# Patient Record
Sex: MALE | Race: WHITE | Employment: FULL TIME | URBAN - METROPOLITAN AREA
[De-identification: names, ages, dates, MRNs, and addresses within clinical notes are randomized per-mention and may not be internally consistent; named-entity substitution may affect disease eponyms.]

---

## 2017-09-06 ENCOUNTER — TELEPHONE (OUTPATIENT)
Dept: FAMILY MEDICINE CLINIC | Facility: CLINIC | Age: 47
End: 2017-09-06

## 2017-09-06 NOTE — TELEPHONE ENCOUNTER
Patient states he has been having elevated bp for awhile now; 2 years or more. States his last reading was 155/100. Patient states his girlfriend is a nurse and she feels something is \"off\" when she takes his bp; skipping beats?   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

## 2017-09-07 ENCOUNTER — OFFICE VISIT (OUTPATIENT)
Dept: FAMILY MEDICINE CLINIC | Facility: CLINIC | Age: 47
End: 2017-09-07

## 2017-09-07 ENCOUNTER — APPOINTMENT (OUTPATIENT)
Dept: LAB | Age: 47
End: 2017-09-07
Attending: FAMILY MEDICINE
Payer: COMMERCIAL

## 2017-09-07 VITALS
TEMPERATURE: 98 F | RESPIRATION RATE: 18 BRPM | HEART RATE: 80 BPM | BODY MASS INDEX: 41.3 KG/M2 | HEIGHT: 73.25 IN | SYSTOLIC BLOOD PRESSURE: 146 MMHG | WEIGHT: 315 LBS | DIASTOLIC BLOOD PRESSURE: 102 MMHG

## 2017-09-07 DIAGNOSIS — Z00.00 PHYSICAL EXAM: ICD-10-CM

## 2017-09-07 DIAGNOSIS — I10 ESSENTIAL HYPERTENSION: ICD-10-CM

## 2017-09-07 DIAGNOSIS — R68.82 LIBIDO, DECREASED: ICD-10-CM

## 2017-09-07 DIAGNOSIS — Z00.00 PHYSICAL EXAM: Primary | ICD-10-CM

## 2017-09-07 DIAGNOSIS — I49.3 PVC (PREMATURE VENTRICULAR CONTRACTION): ICD-10-CM

## 2017-09-07 LAB
25-HYDROXYVITAMIN D (TOTAL): 15.6 NG/ML (ref 30–100)
ALBUMIN SERPL-MCNC: 4 G/DL (ref 3.5–4.8)
ALP LIVER SERPL-CCNC: 55 U/L (ref 45–117)
ALT SERPL-CCNC: 47 U/L (ref 17–63)
AST SERPL-CCNC: 19 U/L (ref 15–41)
BASOPHILS # BLD AUTO: 0.09 X10(3) UL (ref 0–0.1)
BASOPHILS NFR BLD AUTO: 1.2 %
BILIRUB SERPL-MCNC: 0.6 MG/DL (ref 0.1–2)
BILIRUB UR QL STRIP.AUTO: NEGATIVE
BUN BLD-MCNC: 15 MG/DL (ref 8–20)
CALCIUM BLD-MCNC: 9.2 MG/DL (ref 8.3–10.3)
CHLORIDE: 104 MMOL/L (ref 101–111)
CHOLEST SMN-MCNC: 265 MG/DL (ref ?–200)
CO2: 25 MMOL/L (ref 22–32)
COMPLEXED PSA SERPL-MCNC: 0.52 NG/ML (ref 0.01–4)
CREAT BLD-MCNC: 0.95 MG/DL (ref 0.7–1.3)
EOSINOPHIL # BLD AUTO: 0.12 X10(3) UL (ref 0–0.3)
EOSINOPHIL NFR BLD AUTO: 1.6 %
ERYTHROCYTE [DISTWIDTH] IN BLOOD BY AUTOMATED COUNT: 13.2 % (ref 11.5–16)
EST. AVERAGE GLUCOSE BLD GHB EST-MCNC: 128 MG/DL (ref 68–126)
GLUCOSE BLD-MCNC: 96 MG/DL (ref 70–99)
GLUCOSE UR STRIP.AUTO-MCNC: NEGATIVE MG/DL
HAV IGM SER QL: 2.3 MG/DL (ref 1.7–3)
HBA1C MFR BLD HPLC: 6.1 % (ref ?–5.7)
HCT VFR BLD AUTO: 46.2 % (ref 37–53)
HDLC SERPL-MCNC: 36 MG/DL (ref 45–?)
HDLC SERPL: 7.36 {RATIO} (ref ?–4.97)
HGB BLD-MCNC: 15.8 G/DL (ref 13–17)
IMMATURE GRANULOCYTE COUNT: 0.02 X10(3) UL (ref 0–1)
IMMATURE GRANULOCYTE RATIO %: 0.3 %
KETONES UR STRIP.AUTO-MCNC: NEGATIVE MG/DL
LDLC SERPL CALC-MCNC: 170 MG/DL (ref ?–130)
LDLC SERPL-MCNC: 59 MG/DL (ref 5–40)
LEUKOCYTE ESTERASE UR QL STRIP.AUTO: NEGATIVE
LYMPHOCYTES # BLD AUTO: 2.5 X10(3) UL (ref 0.9–4)
LYMPHOCYTES NFR BLD AUTO: 33.2 %
M PROTEIN MFR SERPL ELPH: 7.6 G/DL (ref 6.1–8.3)
MCH RBC QN AUTO: 30 PG (ref 27–33.2)
MCHC RBC AUTO-ENTMCNC: 34.2 G/DL (ref 31–37)
MCV RBC AUTO: 87.8 FL (ref 80–99)
MONOCYTES # BLD AUTO: 0.33 X10(3) UL (ref 0.1–0.6)
MONOCYTES NFR BLD AUTO: 4.4 %
NEUTROPHIL ABS PRELIM: 4.46 X10 (3) UL (ref 1.3–6.7)
NEUTROPHILS # BLD AUTO: 4.46 X10(3) UL (ref 1.3–6.7)
NEUTROPHILS NFR BLD AUTO: 59.3 %
NITRITE UR QL STRIP.AUTO: NEGATIVE
NONHDLC SERPL-MCNC: 229 MG/DL (ref ?–130)
PH UR STRIP.AUTO: 5 [PH] (ref 4.5–8)
PLATELET # BLD AUTO: 289 10(3)UL (ref 150–450)
POTASSIUM SERPL-SCNC: 4.3 MMOL/L (ref 3.6–5.1)
PROT UR STRIP.AUTO-MCNC: NEGATIVE MG/DL
RBC # BLD AUTO: 5.26 X10(6)UL (ref 4.3–5.7)
RED CELL DISTRIBUTION WIDTH-SD: 41.3 FL (ref 35.1–46.3)
SODIUM SERPL-SCNC: 138 MMOL/L (ref 136–144)
SP GR UR STRIP.AUTO: 1.02 (ref 1–1.03)
TRIGLYCERIDES: 297 MG/DL (ref ?–150)
TSI SER-ACNC: 1.33 MIU/ML (ref 0.35–5.5)
UROBILINOGEN UR STRIP.AUTO-MCNC: <2 MG/DL
WBC # BLD AUTO: 7.5 X10(3) UL (ref 4–13)

## 2017-09-07 PROCEDURE — 80050 GENERAL HEALTH PANEL: CPT | Performed by: FAMILY MEDICINE

## 2017-09-07 PROCEDURE — 36415 COLL VENOUS BLD VENIPUNCTURE: CPT | Performed by: FAMILY MEDICINE

## 2017-09-07 PROCEDURE — 84153 ASSAY OF PSA TOTAL: CPT | Performed by: FAMILY MEDICINE

## 2017-09-07 PROCEDURE — 81001 URINALYSIS AUTO W/SCOPE: CPT | Performed by: FAMILY MEDICINE

## 2017-09-07 PROCEDURE — 84403 ASSAY OF TOTAL TESTOSTERONE: CPT | Performed by: FAMILY MEDICINE

## 2017-09-07 PROCEDURE — 99213 OFFICE O/P EST LOW 20 MIN: CPT | Performed by: FAMILY MEDICINE

## 2017-09-07 PROCEDURE — 84402 ASSAY OF FREE TESTOSTERONE: CPT | Performed by: FAMILY MEDICINE

## 2017-09-07 PROCEDURE — 93000 ELECTROCARDIOGRAM COMPLETE: CPT | Performed by: FAMILY MEDICINE

## 2017-09-07 PROCEDURE — 83036 HEMOGLOBIN GLYCOSYLATED A1C: CPT | Performed by: FAMILY MEDICINE

## 2017-09-07 PROCEDURE — 99396 PREV VISIT EST AGE 40-64: CPT | Performed by: FAMILY MEDICINE

## 2017-09-07 PROCEDURE — 82306 VITAMIN D 25 HYDROXY: CPT | Performed by: FAMILY MEDICINE

## 2017-09-07 PROCEDURE — 80061 LIPID PANEL: CPT | Performed by: FAMILY MEDICINE

## 2017-09-07 PROCEDURE — 83735 ASSAY OF MAGNESIUM: CPT | Performed by: FAMILY MEDICINE

## 2017-09-07 RX ORDER — HYDROCHLOROTHIAZIDE 12.5 MG/1
12.5 TABLET ORAL DAILY
Qty: 30 TABLET | Refills: 0 | Status: SHIPPED | OUTPATIENT
Start: 2017-09-07 | End: 2017-09-07

## 2017-09-07 NOTE — PATIENT INSTRUCTIONS
Recommend to start blood pressure medication. Advice low salt diet, weight loss and exercise. Monitor your blood pressure. Return to clinic if systolic blood pressure more than 541 or diastolic more than 171. Stop tobacco use. Check labs today.    Glenda entrances. · Use stairs instead of the elevator. · When you can, walk or bike instead of driving. · [place] leaves, garden, or do household repairs.   · Be active at a moderate to vigorous level of physical activity for at least 40 minutes for a minimum of

## 2017-09-07 NOTE — PROGRESS NOTES
[address]  PROGRESS NOTE  Chief Complaint:   Patient presents with:  Blood Pressure: past few months. Lab      HPI:   This is a 56year old male presents to clinic for evaluation of high blood pressure.   Over past few months patient's EYES:  Denies eye pain, visual loss, blurred vision, double vision or yellow sclerae. HEENT:  Denies hearing loss, sneezing, congestion, runny nose or sore throat. INTEGUMENTARY:  Denies rashes, itching, skin lesion, or excessive skin dryness.   Reza Fraser LYMPHATIC: No cervical lymphadenopathy, no other lymphadenopathy. MUSCULOSKELETAL: Normal ROM, no joint pain, or muscle weakness in all extremity. BACK: No tenderness, no spasm, SLR test negative, FROM.   NEUROLOGICAL:  No deficit, normal gait, strength High blood pressure (hypertension) is often called the silent killer. This is because many people who have it don’t know it. High blood pressure is defined as 140/90 mm Hg or higher. Know your blood pressure and remember to check it regularly.  Doing so can · Communicate your concerns with your loved ones and your healthcare provider. · Visit with family and friends, and keep up with hobbies. Limit alcohol and quit smoking  · Men should have no more than 2 drinks per day.   · Women should have no more than 1

## 2017-09-08 ENCOUNTER — TELEPHONE (OUTPATIENT)
Dept: FAMILY MEDICINE CLINIC | Facility: CLINIC | Age: 47
End: 2017-09-08

## 2017-09-08 NOTE — TELEPHONE ENCOUNTER
----- Message from Luis Mendoza MD sent at 9/8/2017  9:16 AM CDT -----  Please inform patient that his total cholesterol, triglyceride and LDL is very high. Advice low cholesterol diet and exercise. May use fish oil supplement.     Also his hemoglobin A1c

## 2017-09-12 LAB
TESTOSTERONE TOTAL: 174 NG/DL
TESTOSTERONE, FREE -MS/MS: 46.1 PG/ML

## 2017-09-13 ENCOUNTER — TELEPHONE (OUTPATIENT)
Dept: FAMILY MEDICINE CLINIC | Facility: CLINIC | Age: 47
End: 2017-09-13

## 2017-09-13 NOTE — TELEPHONE ENCOUNTER
----- Message from Kimberly Vivas MD sent at 9/12/2017  9:37 PM CDT -----  Please inform patient that his free and total testosterone level is low. Recommend regular exercise and healthy diet.  If he wish to have supplement then recommend to see endocrinol

## 2017-09-21 ENCOUNTER — OFFICE VISIT (OUTPATIENT)
Dept: FAMILY MEDICINE CLINIC | Facility: CLINIC | Age: 47
End: 2017-09-21

## 2017-09-21 VITALS
SYSTOLIC BLOOD PRESSURE: 126 MMHG | DIASTOLIC BLOOD PRESSURE: 78 MMHG | WEIGHT: 315 LBS | HEART RATE: 82 BPM | RESPIRATION RATE: 20 BRPM | BODY MASS INDEX: 42 KG/M2 | TEMPERATURE: 97 F

## 2017-09-21 DIAGNOSIS — I49.3 PVC (PREMATURE VENTRICULAR CONTRACTION): ICD-10-CM

## 2017-09-21 DIAGNOSIS — I10 ESSENTIAL HYPERTENSION: Primary | ICD-10-CM

## 2017-09-21 DIAGNOSIS — N52.9 ERECTILE DYSFUNCTION, UNSPECIFIED ERECTILE DYSFUNCTION TYPE: ICD-10-CM

## 2017-09-21 PROCEDURE — 99213 OFFICE O/P EST LOW 20 MIN: CPT | Performed by: FAMILY MEDICINE

## 2017-09-21 RX ORDER — SILDENAFIL 100 MG/1
50 TABLET, FILM COATED ORAL
Qty: 10 TABLET | Refills: 2 | Status: SHIPPED | COMMUNITY
Start: 2017-09-21 | End: 2019-10-10

## 2017-09-21 NOTE — PROGRESS NOTES
[facility] [place]  PROGRESS NOTE  Chief Complaint:   Patient presents with:  Blood Pressure: 2 week follow up       HPI:   This is a 56year old male presents for follow-up and medication refill.   He was started on metoprolol last office visit, Relation Age of Onset   • Hypertension Mother    • Myocardial infarction [OTHER] Father    • Esophageal cancer [OTHER] Father    • Kidney stone [OTHER] Brother    • Hypercholesterolemia [OTHER] Brother      Allergies:  No Known Allergies  Current Meds: conjunctiva normal.  HEAD:  Normocephalic, atraumatic   EARS: External normal.  Bilateral tympanic membranes clear   NOSE: patent, no nasal discharge   THROAT:  No post-pharyngeal erythema or exudate.   Mouth:  No oral lesions or ulcerations, good dentition or worsening or changing symptoms. Patient is to call with any side effects or complications from the treatments as a result of today.      Problem List:  Patient Active Problem List:     Pure hypercholesterolemia     Pure hypertriglyceridemia     Obesity

## 2017-09-21 NOTE — PATIENT INSTRUCTIONS
Continue current medications   Blood pressure stable today. Advice low salt diet and exercise. Monitor your blood pressure. Return to clinic if systolic blood pressure more than 494 or diastolic more than 647. Trial of viagra.    Schedule holter monitor

## 2017-09-26 ENCOUNTER — HOSPITAL ENCOUNTER (OUTPATIENT)
Dept: CV DIAGNOSTICS | Age: 47
Discharge: HOME | End: 2017-09-26
Attending: FAMILY MEDICINE
Payer: COMMERCIAL

## 2017-09-26 DIAGNOSIS — I49.3 PVC (PREMATURE VENTRICULAR CONTRACTION): ICD-10-CM

## 2017-09-26 DIAGNOSIS — I10 ESSENTIAL HYPERTENSION: ICD-10-CM

## 2017-09-26 PROCEDURE — 93227 XTRNL ECG REC<48 HR R&I: CPT | Performed by: FAMILY MEDICINE

## 2017-09-26 PROCEDURE — 93225 XTRNL ECG REC<48 HRS REC: CPT | Performed by: FAMILY MEDICINE

## 2017-09-27 ENCOUNTER — TELEPHONE (OUTPATIENT)
Dept: FAMILY MEDICINE CLINIC | Facility: CLINIC | Age: 47
End: 2017-09-27

## 2017-09-27 ENCOUNTER — HOSPITAL ENCOUNTER (OUTPATIENT)
Dept: CV DIAGNOSTICS | Age: 47
Discharge: HOME | End: 2017-09-27
Attending: FAMILY MEDICINE
Payer: COMMERCIAL

## 2017-09-27 DIAGNOSIS — R68.82 LIBIDO, DECREASED: Primary | ICD-10-CM

## 2017-09-27 DIAGNOSIS — R79.89 LOW TESTOSTERONE: ICD-10-CM

## 2017-09-27 NOTE — TELEPHONE ENCOUNTER
Recommend to take 4000 units of vitamin D supplement daily for 3 months and recheck at that time. Patient May see Dr Maxine Bowman for testosterone supplement. Please fax lab result to his office.

## 2017-09-27 NOTE — TELEPHONE ENCOUNTER
Patient is wondering if a 50,000 IU supplement of Vitamin D is enough to help increase his Vitmain D level. Patient just got the supplement OTC at the pharmacy.      Patient is also wondering if he could be referred to a endocrinologist for his low testoste

## 2017-10-04 NOTE — TELEPHONE ENCOUNTER
Yes patient is supposed to continue with metoprolol unless his blood pressure or heart rate is drops low. If that occurs recommend to return to clinic otherwise he can follow-up with me in December as recommended.

## 2017-10-04 NOTE — TELEPHONE ENCOUNTER
Patient is wondering if he is suppose to continue taking the metoprolol? Patient states that he feels great on the medication but is out now and needs a refill if he is suppose to continue the medication. Dr Cecilia Amezquita please advise. Thank you.

## 2017-10-06 ENCOUNTER — TELEPHONE (OUTPATIENT)
Dept: FAMILY MEDICINE CLINIC | Facility: CLINIC | Age: 47
End: 2017-10-06

## 2017-10-06 DIAGNOSIS — I49.3 PVC (PREMATURE VENTRICULAR CONTRACTION): Primary | ICD-10-CM

## 2017-10-25 ENCOUNTER — TELEPHONE (OUTPATIENT)
Dept: FAMILY MEDICINE CLINIC | Facility: CLINIC | Age: 47
End: 2017-10-25

## 2017-10-25 NOTE — TELEPHONE ENCOUNTER
We received medical records request from [facility] [employer] [facility] to send most recent office note, demographics, any labs, cardiac related testing and insurance information.  I printed 9/21/17 office notes, 9/7/17 office notes, lab flowsheet, and EKG repo

## 2017-11-06 ENCOUNTER — TELEPHONE (OUTPATIENT)
Dept: FAMILY MEDICINE CLINIC | Facility: CLINIC | Age: 47
End: 2017-11-06

## 2017-11-06 NOTE — TELEPHONE ENCOUNTER
Please advise refill?      Future appt:    Last Appointment:  9/21/2017    Cholesterol, Total (mg/dL)   Date Value   09/07/2017 265 (H)   ----------  HDL Cholesterol (mg/dL)   Date Value   09/07/2017 36 (L)   ----------  LDL Cholesterol (mg/dL)   Date Value

## 2017-11-13 ENCOUNTER — TELEPHONE (OUTPATIENT)
Dept: FAMILY MEDICINE CLINIC | Facility: CLINIC | Age: 47
End: 2017-11-13

## 2017-11-13 NOTE — TELEPHONE ENCOUNTER
----- Message from Alexus Gutierrez MD sent at 11/13/2017  3:31 PM CST -----  Please inform patient that holter monitor shows occassional PVCs, around 2.4% of beats were categorized as PVC.  Recommend to make appointment with Dr Kahlil Lyons and also send copy of re

## 2017-12-21 ENCOUNTER — TELEPHONE (OUTPATIENT)
Dept: FAMILY MEDICINE CLINIC | Facility: CLINIC | Age: 47
End: 2017-12-21

## 2017-12-21 DIAGNOSIS — G47.33 OSA (OBSTRUCTIVE SLEEP APNEA): Primary | ICD-10-CM

## 2017-12-21 NOTE — TELEPHONE ENCOUNTER
Please inform patient that sleep study shows that he has obstructive sleep apnea syndrome. Recommend to see Dr Dalia Omer at [facility] for further evaluation and treatment of sleep apnea.

## 2018-02-05 ENCOUNTER — SLEEP STUDY (OUTPATIENT)
Dept: FAMILY MEDICINE CLINIC | Facility: CLINIC | Age: 48
End: 2018-02-05

## 2018-02-05 DIAGNOSIS — G47.33 OBSTRUCTIVE SLEEP APNEA: Primary | ICD-10-CM

## 2018-02-05 PROCEDURE — 95811 POLYSOM 6/>YRS CPAP 4/> PARM: CPT | Performed by: FAMILY MEDICINE

## 2018-02-06 ENCOUNTER — TELEPHONE (OUTPATIENT)
Dept: FAMILY MEDICINE CLINIC | Facility: CLINIC | Age: 48
End: 2018-02-06

## 2018-02-06 NOTE — TELEPHONE ENCOUNTER
Patient had CPAP Titration study done at [facility] on 2/2/18  Study was ordered by Dr. Cecilio Douglasis was read by Dr. Alis Swan if patient cpap will be managed by Dr. Richelle Hogue or Dr. Ally Diaz  Left message for patient to call office.     Vanessa Teague, 02/06/18,

## 2018-02-07 NOTE — TELEPHONE ENCOUNTER
Patient unsure what the plan is supposed to be. States he will contact Dr. Yina Ramires office and return the call once he knows where to go from here.

## 2018-05-21 ENCOUNTER — TELEPHONE (OUTPATIENT)
Dept: FAMILY MEDICINE CLINIC | Facility: CLINIC | Age: 48
End: 2018-05-21

## 2018-05-21 NOTE — TELEPHONE ENCOUNTER
I left a message letting Rianna Seo know that he missed his appointment today and to call the office to reschedule. I also left in the message that he will be charged a no show fee.

## 2018-06-25 ENCOUNTER — OFFICE VISIT (OUTPATIENT)
Dept: FAMILY MEDICINE CLINIC | Facility: CLINIC | Age: 48
End: 2018-06-25

## 2018-06-25 VITALS
WEIGHT: 287.38 LBS | HEART RATE: 68 BPM | RESPIRATION RATE: 16 BRPM | HEIGHT: 73.25 IN | DIASTOLIC BLOOD PRESSURE: 80 MMHG | TEMPERATURE: 97 F | SYSTOLIC BLOOD PRESSURE: 114 MMHG | BODY MASS INDEX: 37.68 KG/M2

## 2018-06-25 DIAGNOSIS — Z00.00 PHYSICAL EXAM: Primary | ICD-10-CM

## 2018-06-25 DIAGNOSIS — R21 RASH AND NONSPECIFIC SKIN ERUPTION: ICD-10-CM

## 2018-06-25 PROCEDURE — 99396 PREV VISIT EST AGE 40-64: CPT | Performed by: FAMILY MEDICINE

## 2018-06-25 RX ORDER — CHLORAL HYDRATE 500 MG
1 CAPSULE ORAL DAILY
COMMUNITY

## 2018-06-25 NOTE — PATIENT INSTRUCTIONS
Continue current medications   Continue with healthy diet, exercise and weight loss. Schedule appointment with dermatologist   Return to clinic for fasting labs.

## 2018-06-25 NOTE — PROGRESS NOTES
[facility]      HPI:   Ambika Jimenez is a 56year old male who presents for an Annual Health Visit. Patient is complaining of intermittent rash on face, chest and in the groin region. Groin rash has improved with Lamisil.   He toy Packs/day: 0.00      Years: 0.00         Types: Cigars  Smokeless tobacco: Former User                        Types: Chew  Alcohol use:  Yes              Comment: occasional    Social History Narrative    Single TMG, no carotid bruits  RESPIRATORY: clear to percussion and auscultation  CARDIOVASCULAR: S1, S2 normal, RRR; no S3, no S4; no click; murmur negative  ABDOMEN: normal active BS+, soft, nontender, nondistended; no HSM; no masses; no bruits.   GENITAL/: pe

## 2018-11-27 ENCOUNTER — TELEPHONE (OUTPATIENT)
Dept: FAMILY MEDICINE CLINIC | Facility: CLINIC | Age: 48
End: 2018-11-27

## 2018-11-27 NOTE — TELEPHONE ENCOUNTER
Informed patient that lab orders were sent to [facility]. Patient agreed and had no other questions at this time.

## 2018-12-19 ENCOUNTER — TELEPHONE (OUTPATIENT)
Dept: FAMILY MEDICINE CLINIC | Facility: CLINIC | Age: 48
End: 2018-12-19

## 2018-12-19 NOTE — TELEPHONE ENCOUNTER
----- Message from Norma Black MD sent at 12/18/2018  9:16 PM CST -----  Please inform patient that he has elevated cholesterol and A1c. Also has low Vit D and testosterone.    Recommend to schedule appointment with me to discuss abnormal labs and to [facility]

## 2018-12-19 NOTE — TELEPHONE ENCOUNTER
Let pt know the following below. Pt verbalized his understanding and had no other questions at this time.    Future Appointments   Date Time Provider Robyn Cortez   12/27/2018  2:40 PM Tasneem Rausch MD [facility] [place] [facility]

## 2018-12-27 ENCOUNTER — OFFICE VISIT (OUTPATIENT)
Dept: FAMILY MEDICINE CLINIC | Facility: CLINIC | Age: 48
End: 2018-12-27
Payer: COMMERCIAL

## 2018-12-27 VITALS
SYSTOLIC BLOOD PRESSURE: 124 MMHG | BODY MASS INDEX: 39.68 KG/M2 | HEIGHT: 73.25 IN | OXYGEN SATURATION: 96 % | WEIGHT: 302.63 LBS | TEMPERATURE: 100 F | DIASTOLIC BLOOD PRESSURE: 80 MMHG | RESPIRATION RATE: 18 BRPM | HEART RATE: 60 BPM

## 2018-12-27 DIAGNOSIS — I10 ESSENTIAL HYPERTENSION: ICD-10-CM

## 2018-12-27 DIAGNOSIS — E78.00 PURE HYPERCHOLESTEROLEMIA: Primary | ICD-10-CM

## 2018-12-27 DIAGNOSIS — E55.9 VITAMIN D DEFICIENCY: ICD-10-CM

## 2018-12-27 DIAGNOSIS — R73.9 HYPERGLYCEMIA: ICD-10-CM

## 2018-12-27 PROCEDURE — 99214 OFFICE O/P EST MOD 30 MIN: CPT | Performed by: FAMILY MEDICINE

## 2018-12-27 RX ORDER — ATORVASTATIN CALCIUM 10 MG/1
10 TABLET, FILM COATED ORAL NIGHTLY
Qty: 30 TABLET | Refills: 2 | Status: SHIPPED | OUTPATIENT
Start: 2018-12-27 | End: 2019-04-01

## 2018-12-27 RX ORDER — ERGOCALCIFEROL 1.25 MG/1
50000 CAPSULE ORAL WEEKLY
Qty: 12 CAPSULE | Refills: 0 | Status: SHIPPED | OUTPATIENT
Start: 2018-12-27 | End: 2019-03-27

## 2018-12-27 NOTE — PROGRESS NOTES
[address]  PROGRESS NOTE  Chief Complaint:   Patient presents with: Follow - Up: discuss labs      HPI:   This is a 53year old male with history of hypertension presents for follow-up.   Patient also recently had labs done which shows tablet by mouth daily. Disp:  Rfl:    metoprolol Tartrate 25 MG Oral Tab Take 1 tablet (25 mg total) by mouth daily.  (Patient taking differently: Take 25 mg by mouth 2 (two) times daily.  ) Disp: 30 tablet Rfl: 0   Sildenafil Citrate (VIAGRA) 100 MG Oral T normal, PERRLA, EOMI. NECK: Supple, no carotid bruit, no JVD, no thyromegaly. LUNGS: Clear to auscultation bilterally, no rales/rhonchi/wheezing. HEART:  Regular rate and rhythm, S1 and S2 are normal, no murmurs, rubs or gallops.   EXTREMITIES: No edema, result of today.      Problem List:  Patient Active Problem List:     Pure hypercholesterolemia     Pure hypertriglyceridemia     Obesity     Essential hypertension     Libido, decreased     PVC (premature ventricular contraction)     SHAWN (obstructive sleep

## 2018-12-27 NOTE — PATIENT INSTRUCTIONS
Continue current medications  Advice low cholesterol diet and exercise. May use fish oil supplement. Start atorvastatin for cholesterol. Recommend weight loss. Advice low salt diet and exercise. Monitor your blood pressure.  Return to clinic if systol

## 2019-03-26 RX ORDER — ERGOCALCIFEROL 1.25 MG/1
CAPSULE ORAL
Qty: 12 CAPSULE | Refills: 0 | OUTPATIENT
Start: 2019-03-26

## 2019-04-01 RX ORDER — ATORVASTATIN CALCIUM 10 MG/1
TABLET, FILM COATED ORAL
Qty: 30 TABLET | Refills: 0 | Status: SHIPPED | OUTPATIENT
Start: 2019-04-01 | End: 2019-05-04

## 2019-04-01 NOTE — TELEPHONE ENCOUNTER
Future appt:     Your appointments     Date & Time Appointment Department [facility])    Apr 25, 2019  4:20 PM CDT Follow up with Jasen De Jesus, [address], [address]        [facility]

## 2019-04-15 ENCOUNTER — TELEPHONE (OUTPATIENT)
Dept: FAMILY MEDICINE CLINIC | Facility: CLINIC | Age: 49
End: 2019-04-15

## 2019-04-15 NOTE — TELEPHONE ENCOUNTER
Patient is requesting refill for Atorvastatin sent to [facility] in [facility]

## 2019-04-15 NOTE — TELEPHONE ENCOUNTER
RF given 4/1/2019 #30 with 0 RFs. Spoke with the pharmacist who states patient picked up the script on 4/1/2019. Spoke with patient. States he is just trying to get a \"head start\" because he knows there is no RFs left on the script.   Informed dilia

## 2019-04-25 ENCOUNTER — OFFICE VISIT (OUTPATIENT)
Dept: FAMILY MEDICINE CLINIC | Facility: CLINIC | Age: 49
End: 2019-04-25
Payer: COMMERCIAL

## 2019-04-25 VITALS
OXYGEN SATURATION: 97 % | SYSTOLIC BLOOD PRESSURE: 130 MMHG | DIASTOLIC BLOOD PRESSURE: 80 MMHG | BODY MASS INDEX: 37.39 KG/M2 | WEIGHT: 285.19 LBS | RESPIRATION RATE: 18 BRPM | HEIGHT: 73.25 IN | HEART RATE: 76 BPM | TEMPERATURE: 97 F

## 2019-04-25 DIAGNOSIS — R73.9 HYPERGLYCEMIA: ICD-10-CM

## 2019-04-25 DIAGNOSIS — L21.0 DANDRUFF: ICD-10-CM

## 2019-04-25 DIAGNOSIS — R79.89 LOW TESTOSTERONE: ICD-10-CM

## 2019-04-25 DIAGNOSIS — E78.00 HYPERCHOLESTEREMIA: Primary | ICD-10-CM

## 2019-04-25 PROCEDURE — 99214 OFFICE O/P EST MOD 30 MIN: CPT | Performed by: FAMILY MEDICINE

## 2019-04-25 RX ORDER — KETOCONAZOLE 20 MG/ML
5 SHAMPOO TOPICAL
Qty: 120 ML | Refills: 2 | Status: SHIPPED | OUTPATIENT
Start: 2019-04-25

## 2019-04-25 RX ORDER — KETOCONAZOLE 20 MG/G
CREAM TOPICAL
Refills: 3 | COMMUNITY
Start: 2019-01-14 | End: 2020-07-17

## 2019-04-25 NOTE — PROGRESS NOTES
[address]  PROGRESS NOTE  Chief Complaint:   Patient presents with: Follow - Up      HPI:   This is a 53year old male with history of hypertension, hypercholesterolemia and hyperglycemia presents for follow-up.   Last office visit phuong topically twice a week. Disp: 168 mL Rfl: 2   ATORVASTATIN 10 MG Oral Tab TAKE 1 TABLET BY MOUTH EVERY DAY AT NIGHT Disp: 30 tablet Rfl: 0   Omega-3 1000 MG Oral Cap Take 1 tablet by mouth daily. Disp:  Rfl:    TURMERIC OR Take 1 tablet by mouth daily.  Dis nourished, no acute distress. EYES:  Sclera anicteric, conjunctiva normal, PERRLA, EOMI. NECK: Supple, no carotid bruit, no JVD, no thyromegaly. LUNGS: Clear to auscultation bilterally, no rales/rhonchi/wheezing.   HEART:  Regular rate and rhythm, S1 an questions, complications, allergies, or worsening or changing symptoms. Patient is to call with any side effects or complications from the treatments as a result of today.      Problem List:  Patient Active Problem List:     Pure hypercholesterolemia     P

## 2019-04-25 NOTE — PATIENT INSTRUCTIONS
Continue current medications  Advice low cholesterol diet and exercise. May use fish oil supplement. Return to clinic for fasting labs.    Advice low carb in diet, AVOID FOOD WITH HIGH GLYCEMIC INDEX, have smaller portion meal, no juice or soda, don't eat

## 2019-05-04 RX ORDER — ATORVASTATIN CALCIUM 10 MG/1
TABLET, FILM COATED ORAL
Qty: 90 TABLET | Refills: 1 | Status: SHIPPED | OUTPATIENT
Start: 2019-05-04 | End: 2019-11-11

## 2019-05-04 NOTE — TELEPHONE ENCOUNTER
Future appt:    Last Appointment:  4/25/2019 f/u care; return in 6 months for physical  CHOLESTEROL, TOTAL (mg/dL)   Date Value   12/17/2018 252 (H)     HDL CHOLESTEROL (mg/dL)   Date Value   12/17/2018 39 (L)     LDL-CHOLESTEROL (mg/dL (calc))   Date Valu

## 2019-05-09 ENCOUNTER — TELEPHONE (OUTPATIENT)
Dept: FAMILY MEDICINE CLINIC | Facility: CLINIC | Age: 49
End: 2019-05-09

## 2019-05-09 DIAGNOSIS — R79.89 LOW TESTOSTERONE: Primary | ICD-10-CM

## 2019-05-09 NOTE — TELEPHONE ENCOUNTER
----- Message from Kareem Borges MD sent at 5/9/2019  1:58 PM CDT -----  Please inform patient that his glucose level is very slightly elevated at 103, rest of CMP is normal.  His total cholesterol is 201, it is improved from last time, still slightly elev

## 2019-05-10 NOTE — TELEPHONE ENCOUNTER
Patient informed of the following below. Patient agreed to the following below. Patient is wondering if he could get his Testosterone level rechecked? Patient would like to have it rechecked to see if its continuing to stay low?  Patient states that [facility]

## 2019-05-10 NOTE — TELEPHONE ENCOUNTER
----- Message from Noel Nails sent at 5/10/2019  8:58 AM CDT -----  Contact: pt   Returning call from message through [address] [place] [address] [phone]

## 2019-05-10 NOTE — TELEPHONE ENCOUNTER
Let pt know the following below. Pt verbalized his understanding and had no other questions at this time.    Faxed to [phone].

## 2019-05-10 NOTE — TELEPHONE ENCOUNTER
Please inform patient that I am okay with reordering it. Please make sure patient understands that he needs to have at least 2 low testosterone consecutively in order to start on supplementation. Order placed. We can fax or patient can  order.

## 2019-10-10 ENCOUNTER — OFFICE VISIT (OUTPATIENT)
Dept: FAMILY MEDICINE CLINIC | Facility: CLINIC | Age: 49
End: 2019-10-10
Payer: COMMERCIAL

## 2019-10-10 VITALS
RESPIRATION RATE: 16 BRPM | HEART RATE: 69 BPM | BODY MASS INDEX: 38.55 KG/M2 | DIASTOLIC BLOOD PRESSURE: 74 MMHG | SYSTOLIC BLOOD PRESSURE: 136 MMHG | TEMPERATURE: 98 F | HEIGHT: 73.25 IN | OXYGEN SATURATION: 96 % | WEIGHT: 294 LBS

## 2019-10-10 DIAGNOSIS — J30.89 ALLERGIC RHINITIS DUE TO OTHER ALLERGIC TRIGGER, UNSPECIFIED SEASONALITY: ICD-10-CM

## 2019-10-10 DIAGNOSIS — R68.82 LIBIDO, DECREASED: ICD-10-CM

## 2019-10-10 DIAGNOSIS — I10 ESSENTIAL HYPERTENSION: Primary | ICD-10-CM

## 2019-10-10 DIAGNOSIS — E78.00 HYPERCHOLESTEREMIA: ICD-10-CM

## 2019-10-10 PROCEDURE — 99214 OFFICE O/P EST MOD 30 MIN: CPT | Performed by: FAMILY MEDICINE

## 2019-10-10 RX ORDER — TADALAFIL 20 MG/1
20 TABLET ORAL
Qty: 5 TABLET | Refills: 2 | Status: SHIPPED | OUTPATIENT
Start: 2019-10-10

## 2019-10-10 RX ORDER — MONTELUKAST SODIUM 10 MG/1
10 TABLET ORAL NIGHTLY
Qty: 30 TABLET | Refills: 2 | Status: SHIPPED | OUTPATIENT
Start: 2019-10-10 | End: 2020-01-06

## 2019-10-10 RX ORDER — TRIAMCINOLONE ACETONIDE 55 UG/1
1 SPRAY, METERED NASAL DAILY
Qty: 1 INHALER | Refills: 2 | Status: SHIPPED | OUTPATIENT
Start: 2019-10-10

## 2019-10-10 NOTE — PROGRESS NOTES
[facility] [place]  PROGRESS NOTE  Chief Complaint:   Patient presents with:  Nasal Congestion  Follow - Up      HPI:   This is a 51year old male with history of hypertension, hypercholesterolemia and a decreased libido presents for follow-up. Other (Kidney stone) Brother    • Other (Hypercholesterolemia) Brother      Allergies:  No Known Allergies  Current Meds:    Current Outpatient Medications:   Tadalafil 20 MG Oral Tab Take 1 tablet (20 mg total) by mouth daily as needed for Erectile Dysfunc in bowel or bladder control. HEMATOLOGIC:  Denies anemia, bleeding or bruising. PSYCHIATRIC:  Denies depression or anxiety. ENDOCRINOLOGIC:  Denies excessive sweating, cold or heat intolerance, polyuria or polydipsia.       EXAM:   /74   Pulse 69 unspecified seasonality  -     ALLERGY REGION 8    Libido, decreased    Other orders  -     Tadalafil 20 MG Oral Tab;  Take 1 tablet (20 mg total) by mouth daily as needed for Erectile Dysfunction.  -     Triamcinolone Acetonide 55 MCG/ACT Nasal Aerosol; 1

## 2019-10-10 NOTE — PATIENT INSTRUCTIONS
Continue current medications  Advice low cholesterol diet and exercise. May use fish oil supplement. Advice low salt diet and exercise. Monitor your blood pressure. Return to clinic if systolic blood pressure more than 257 or diastolic more than 278.   London Garcia

## 2019-11-11 RX ORDER — ATORVASTATIN CALCIUM 10 MG/1
TABLET, FILM COATED ORAL
Qty: 90 TABLET | Refills: 1 | Status: SHIPPED | OUTPATIENT
Start: 2019-11-11 | End: 2020-05-18

## 2019-11-11 NOTE — TELEPHONE ENCOUNTER
Last refill 5/4/19  Future appt:     Your appointments     Date & Time Appointment Department [facility])    Apr 10, 2020  4:20 PM CDT Physical - Established with Alejandra Gayle MD [address] [place] [address] ([facility]

## 2020-01-06 RX ORDER — MONTELUKAST SODIUM 10 MG/1
TABLET ORAL
Qty: 90 TABLET | Refills: 0 | Status: SHIPPED | OUTPATIENT
Start: 2020-01-06

## 2020-01-06 NOTE — TELEPHONE ENCOUNTER
Future appt:     Your appointments     Date & Time Appointment Department [facility])    Apr 10, 2020  4:20 PM CDT Physical - Established with Marilee García, [address] [facility] [place] ([facility]

## 2020-05-18 RX ORDER — ATORVASTATIN CALCIUM 10 MG/1
TABLET, FILM COATED ORAL
Qty: 90 TABLET | Refills: 0 | Status: SHIPPED | OUTPATIENT
Start: 2020-05-18 | End: 2020-08-20

## 2020-05-18 NOTE — TELEPHONE ENCOUNTER
Future appt:     Your appointments     Date & Time Appointment Department [facility])    Jul 10, 2020  4:20 PM CDT Physical - Established with Rebecca Mayo MD [address] [place] [address]            Baljit Villela

## 2020-05-29 PROBLEM — F32.A DEPRESSION: Status: ACTIVE | Noted: 2020-05-29

## 2020-05-29 PROBLEM — F41.9 ANXIETY: Status: ACTIVE | Noted: 2020-05-29

## 2020-05-29 NOTE — PROGRESS NOTES
Clarence Houston  verbally consents to a Virtual/Telephone Check-In service on 5/29/2020. Patient understands and accepts financial responsibility for any deductible, co-insurance and/or co-pays associated with this service.     Duration of the service: 12 control. HEMATOLOGIC:  Denies anemia, bleeding or bruising. PSYCHIATRIC: See HPI  ENDOCRINOLOGIC:  Denies excessive sweating, cold or heat intolerance, polyuria or polydipsia. OBJECTIVE :  EXAM :  There were no vitals taken for this visit.  Estimated b

## 2020-05-29 NOTE — PATIENT INSTRUCTIONS
Recommend to start Lexapro daily for anxiety and depressed mood. Continue to see a counselor. Side effect of medication discussed. Blood pressure stable. Advice low salt diet and exercise. Monitor your blood pressure.  Return to clinic if systolic blood

## 2020-06-20 RX ORDER — ESCITALOPRAM OXALATE 10 MG/1
TABLET ORAL
Qty: 30 TABLET | Refills: 0 | Status: SHIPPED | OUTPATIENT
Start: 2020-06-20 | End: 2020-07-16

## 2020-06-20 NOTE — TELEPHONE ENCOUNTER
Future appt:     Your appointments     Date & Time Appointment Department [facility])    Jul 10, 2020  4:20 PM CDT Physical - Established with Billie Maloney MD [address] [place] [address]

## 2020-06-23 ENCOUNTER — TELEPHONE (OUTPATIENT)
Dept: FAMILY MEDICINE CLINIC | Facility: CLINIC | Age: 50
End: 2020-06-23

## 2020-06-23 NOTE — TELEPHONE ENCOUNTER
Patient needs a refill on his Escitalopram 10mg to [facility] pharmacy in [place].

## 2020-06-24 NOTE — TELEPHONE ENCOUNTER
Confirmed with pharmacy that rx was ready for patient to . Informed [facility] that [facility] has [facility] for [facility] to pick. Patient had no other questions at this time.

## 2020-06-29 NOTE — TELEPHONE ENCOUNTER
Let pt know the following below. Pt verbalized his understanding and had no other questions at this time.    Future Appointments  Date Time Provider Robyn Cortez   9/21/2017 9:00 AM Mikal Joseph MD [facility] [place] [facility]     Patient will discuss f Unable to leave voicemail for [address]. Voicemail box is full.

## 2020-07-16 NOTE — TELEPHONE ENCOUNTER
Future appt:     Your appointments     Date & Time Appointment Department [facility])    Jul 17, 2020  4:40 PM CDT Physical - Established with Andre De Jesus MD [address] [facility] [place] ([facility])            Kindra Dill

## 2020-07-17 ENCOUNTER — OFFICE VISIT (OUTPATIENT)
Dept: FAMILY MEDICINE CLINIC | Facility: CLINIC | Age: 50
End: 2020-07-17
Payer: COMMERCIAL

## 2020-07-17 VITALS
WEIGHT: 303 LBS | HEIGHT: 73.25 IN | DIASTOLIC BLOOD PRESSURE: 80 MMHG | RESPIRATION RATE: 18 BRPM | BODY MASS INDEX: 39.73 KG/M2 | SYSTOLIC BLOOD PRESSURE: 110 MMHG | OXYGEN SATURATION: 97 % | TEMPERATURE: 98 F | HEART RATE: 78 BPM

## 2020-07-17 DIAGNOSIS — Z13.1 DIABETES MELLITUS SCREENING: ICD-10-CM

## 2020-07-17 DIAGNOSIS — R73.9 HYPERGLYCEMIA: ICD-10-CM

## 2020-07-17 DIAGNOSIS — Z00.00 WELLNESS EXAMINATION: ICD-10-CM

## 2020-07-17 DIAGNOSIS — Z13.29 THYROID DISORDER SCREEN: ICD-10-CM

## 2020-07-17 DIAGNOSIS — E66.9 CLASS 2 OBESITY WITHOUT SERIOUS COMORBIDITY WITH BODY MASS INDEX (BMI) OF 39.0 TO 39.9 IN ADULT, UNSPECIFIED OBESITY TYPE: ICD-10-CM

## 2020-07-17 DIAGNOSIS — F41.9 ANXIETY: ICD-10-CM

## 2020-07-17 DIAGNOSIS — E55.9 VITAMIN D DEFICIENCY: ICD-10-CM

## 2020-07-17 DIAGNOSIS — Z00.00 PHYSICAL EXAM: Primary | ICD-10-CM

## 2020-07-17 DIAGNOSIS — Z13.0 SCREENING FOR DEFICIENCY ANEMIA: ICD-10-CM

## 2020-07-17 DIAGNOSIS — Z13.220 LIPID SCREENING: ICD-10-CM

## 2020-07-17 DIAGNOSIS — Z12.5 PROSTATE CANCER SCREENING: ICD-10-CM

## 2020-07-17 DIAGNOSIS — F32.89 OTHER DEPRESSION: ICD-10-CM

## 2020-07-17 PROCEDURE — 3079F DIAST BP 80-89 MM HG: CPT | Performed by: FAMILY MEDICINE

## 2020-07-17 PROCEDURE — 3008F BODY MASS INDEX DOCD: CPT | Performed by: FAMILY MEDICINE

## 2020-07-17 PROCEDURE — 3074F SYST BP LT 130 MM HG: CPT | Performed by: FAMILY MEDICINE

## 2020-07-17 PROCEDURE — 99396 PREV VISIT EST AGE 40-64: CPT | Performed by: FAMILY MEDICINE

## 2020-07-17 RX ORDER — KETOCONAZOLE 20 MG/G
CREAM TOPICAL
Qty: 1 TUBE | Refills: 3 | Status: SHIPPED | OUTPATIENT
Start: 2020-07-17

## 2020-07-17 RX ORDER — ESCITALOPRAM OXALATE 10 MG/1
10 TABLET ORAL DAILY
Qty: 30 TABLET | Refills: 0 | Status: SHIPPED | OUTPATIENT
Start: 2020-07-17 | End: 2020-07-20

## 2020-07-17 NOTE — PROGRESS NOTES
[address] [place] [address]    Chief Complaint:   Patient presents with:  Physical      HPI:   Mc Maki is a 51year old male who presents for an Annual Health Visit.    Patient has history of depression and anxiety which is currently stable with (Myocardial infarction) Father    • Other (Esophageal cancer) Father    • Other (Kidney stone) Brother    • Other (Hypercholesterolemia) Brother       SOCIAL HISTORY   Social History    Tobacco Use      Smoking status: Former Smoker        Types: Cigarette no rashes, no suspicious lesions  EYES: PERRLA, Extra Occular Movements Intact, sclera anicteric, conjunctiva normal; fundi normal, there is no nystagmus  HEENT: normocephalic; normal nose, pharynx.  TM's clear, no bulging, no retraction, no fluid, landmark and exercise. Monitor your blood pressure. Return to clinic if systolic blood pressure more than 917 or diastolic more than 358.   Advice low carb in diet, AVOID FOOD WITH HIGH GLYCEMIC INDEX, have smaller portion meal, avoid bread, pasta and potatoes, no j

## 2020-07-17 NOTE — PATIENT INSTRUCTIONS
Continue current medications  Blood pressure stable. Advice low salt diet and exercise. Monitor your blood pressure. Return to clinic if systolic blood pressure more than 577 or diastolic more than 980.   Advice low carb in diet, AVOID FOOD WITH HIGH GLYC

## 2020-07-20 ENCOUNTER — TELEPHONE (OUTPATIENT)
Dept: FAMILY MEDICINE CLINIC | Facility: CLINIC | Age: 50
End: 2020-07-20

## 2020-07-20 RX ORDER — ESCITALOPRAM OXALATE 10 MG/1
10 TABLET ORAL DAILY
Qty: 90 TABLET | Refills: 0 | Status: SHIPPED | OUTPATIENT
Start: 2020-07-20 | End: 2020-10-26

## 2020-07-20 RX ORDER — ESCITALOPRAM OXALATE 10 MG/1
10 TABLET ORAL DAILY
Qty: 90 TABLET | Refills: 0 | Status: SHIPPED | OUTPATIENT
Start: 2020-07-20 | End: 2020-07-20

## 2020-07-20 NOTE — TELEPHONE ENCOUNTER
Future appt:    Last Appointment with provider:   7/17/2020  Last appointment at [facility]:  7/17/2020  CHOLESTEROL, TOTAL (mg/dL)   Date Value   05/01/2019 201 (H)     HDL CHOLESTEROL (mg/dL)   Date Value   05/01/2019 38 (L)     LDL-CHOLESTEROL (mg/dL

## 2020-07-20 NOTE — TELEPHONE ENCOUNTER
Recent refills called to wrong pharmacy. Please resent them this time to [facility] in [place]. Account has been updated. No need to call patient unless there is problems or questions.

## 2020-08-20 RX ORDER — ATORVASTATIN CALCIUM 10 MG/1
TABLET, FILM COATED ORAL
Qty: 90 TABLET | Refills: 0 | Status: SHIPPED | OUTPATIENT
Start: 2020-08-20 | End: 2020-12-10

## 2020-08-20 NOTE — TELEPHONE ENCOUNTER
Future appt:    Last Appointment with provider:   7/17/2020  Last appointment at [facility]:  7/17/2020  CHOLESTEROL, TOTAL (mg/dL)   Date Value   05/01/2019 201 (H)     HDL CHOLESTEROL (mg/dL)   Date Value   05/01/2019 38 (L)     LDL-CHOLESTEROL (mg/dL

## 2020-09-30 ENCOUNTER — TELEPHONE (OUTPATIENT)
Dept: FAMILY MEDICINE CLINIC | Facility: CLINIC | Age: 50
End: 2020-09-30

## 2020-09-30 NOTE — TELEPHONE ENCOUNTER
Patient states he has been trying to remove ear wax from his right ear without relief. Has used ear wax removal products/candles/watched video's. Questions if he should see an ENT.     Appt given with  for evaluation of ear pain/wax removal and

## 2020-10-07 ENCOUNTER — TELEPHONE (OUTPATIENT)
Dept: FAMILY MEDICINE CLINIC | Facility: CLINIC | Age: 50
End: 2020-10-07

## 2020-10-07 DIAGNOSIS — H92.09 OTALGIA, UNSPECIFIED LATERALITY: Primary | ICD-10-CM

## 2020-10-07 NOTE — TELEPHONE ENCOUNTER
Pt states he has a long history of having ear problems and wax build up. Pt states he has tried all over the counter remedies and has discussed with Dr. Juan Marin going to ENT. Pt would like to go to ENT at at this time.   Pt would like Dr. Jillian Santana

## 2020-10-08 NOTE — TELEPHONE ENCOUNTER
Spoke with pt and gave him the referral information. He understands and he will be giving them a call.

## 2020-10-26 RX ORDER — ESCITALOPRAM OXALATE 10 MG/1
TABLET ORAL
Qty: 90 TABLET | Refills: 0 | Status: SHIPPED | OUTPATIENT
Start: 2020-10-26 | End: 2021-01-31

## 2020-12-10 ENCOUNTER — TELEPHONE (OUTPATIENT)
Dept: FAMILY MEDICINE CLINIC | Facility: CLINIC | Age: 50
End: 2020-12-10

## 2020-12-10 RX ORDER — ATORVASTATIN CALCIUM 10 MG/1
TABLET, FILM COATED ORAL
Qty: 90 TABLET | Refills: 0 | Status: SHIPPED | OUTPATIENT
Start: 2020-12-10 | End: 2021-03-05

## 2020-12-10 NOTE — TELEPHONE ENCOUNTER
Pt called and states he needs a refill on his metoprolol as he is out of this. Please advise and send to [facility] in [place].

## 2020-12-10 NOTE — TELEPHONE ENCOUNTER
Future appt:    Last Appointment with provider:   10/7/2020  Last appointment at [facility]:  10/7/2020-Px    ATORVASTATIN 10 MG Oral Tab-Sig:   TAKE 1 TABLET BY MOUTH EVERY DAY AT NIGHT    Last refilled on 08/20/2020-#90 with 0 refills.    CHOLESTEROL, T

## 2021-01-30 NOTE — TELEPHONE ENCOUNTER
Please advise refill request from [facility] pharmacy for Escitalopram.    At last exam for physical on 1/17/20:     Return in 4 months (on 11/17/2020) for follow up. Spoke with patient . F/u appt scheduled with Dr. Nguyen Mitchell.    Future Appointments   Date Time P

## 2021-02-22 ENCOUNTER — OFFICE VISIT (OUTPATIENT)
Dept: FAMILY MEDICINE CLINIC | Facility: CLINIC | Age: 51
End: 2021-02-22
Payer: COMMERCIAL

## 2021-02-22 VITALS
HEIGHT: 73.25 IN | RESPIRATION RATE: 16 BRPM | OXYGEN SATURATION: 98 % | BODY MASS INDEX: 41.17 KG/M2 | TEMPERATURE: 99 F | DIASTOLIC BLOOD PRESSURE: 62 MMHG | WEIGHT: 314 LBS | SYSTOLIC BLOOD PRESSURE: 138 MMHG | HEART RATE: 60 BPM

## 2021-02-22 DIAGNOSIS — I10 ESSENTIAL HYPERTENSION: Primary | ICD-10-CM

## 2021-02-22 DIAGNOSIS — F32.89 OTHER DEPRESSION: ICD-10-CM

## 2021-02-22 DIAGNOSIS — E66.9 CLASS 2 OBESITY WITHOUT SERIOUS COMORBIDITY WITH BODY MASS INDEX (BMI) OF 39.0 TO 39.9 IN ADULT, UNSPECIFIED OBESITY TYPE: ICD-10-CM

## 2021-02-22 DIAGNOSIS — F41.9 ANXIETY: ICD-10-CM

## 2021-02-22 DIAGNOSIS — E78.1 PURE HYPERTRIGLYCERIDEMIA: ICD-10-CM

## 2021-02-22 DIAGNOSIS — R73.9 HYPERGLYCEMIA: ICD-10-CM

## 2021-02-22 PROCEDURE — 99214 OFFICE O/P EST MOD 30 MIN: CPT | Performed by: FAMILY MEDICINE

## 2021-02-22 PROCEDURE — 3078F DIAST BP <80 MM HG: CPT | Performed by: FAMILY MEDICINE

## 2021-02-22 PROCEDURE — 3075F SYST BP GE 130 - 139MM HG: CPT | Performed by: FAMILY MEDICINE

## 2021-02-22 PROCEDURE — 3008F BODY MASS INDEX DOCD: CPT | Performed by: FAMILY MEDICINE

## 2021-02-22 RX ORDER — SILDENAFIL CITRATE 20 MG/1
TABLET ORAL
COMMUNITY
Start: 2021-01-20

## 2021-02-22 RX ORDER — ESCITALOPRAM OXALATE 5 MG/1
5 TABLET ORAL DAILY
Qty: 30 TABLET | Refills: 0 | Status: SHIPPED | OUTPATIENT
Start: 2021-02-22 | End: 2021-03-19

## 2021-02-23 NOTE — PROGRESS NOTES
[facility]  [facility]  Chief Complaint:   Patient presents with:  Medication Follow-Up      HPI:   This is a 53year old male patient with hypertension, hypertriglyceridemia, hyperglycemia presents for follow-up and medication refill mouth 2 (two) times daily. 180 tablet 0   • ketoconazole 2 % External Cream APPLY TWICE DAILY TO RASH. 1 Tube 3   • Tadalafil 20 MG Oral Tab Take 1 tablet (20 mg total) by mouth daily as needed for Erectile Dysfunction.  5 tablet 2   • Triamcinolone Acetoni Estimated body mass index is 41.15 kg/m² as calculated from the following:    Height as of this encounter: 6' 1.25\" (1.861 m). Weight as of this encounter: 314 lb (142.4 kg). Vital signs reviewed.   Physical Exam:  GEN:  Patient is alert, awake and or WITH HIGH GLYCEMIC INDEX, have smaller portion meal, avoid bread, pasta and potatoes, no juice or soda, don't eat late at night, exercise,  and weight loss. Depression and anxiety improved. Taper off on lexapro.      Check labs at [facility]

## 2021-02-23 NOTE — PATIENT INSTRUCTIONS
Continue current medications  Blood pressure stable today. Advice low salt diet and exercise. Monitor your blood pressure. Return to clinic if systolic blood pressure more than 890 or diastolic more than 641.   Advice low carb in diet, AVOID FOOD WITH HIG

## 2021-03-05 RX ORDER — ATORVASTATIN CALCIUM 10 MG/1
10 TABLET, FILM COATED ORAL NIGHTLY
Qty: 90 TABLET | Refills: 1 | Status: SHIPPED | OUTPATIENT
Start: 2021-03-05

## 2021-03-05 NOTE — TELEPHONE ENCOUNTER
Future appt:    Last Appointment with provider:   2/22/2021  Last appointment at [facility]:  2/22/2021  CHOLESTEROL, TOTAL (mg/dL)   Date Value   05/01/2019 201 (H)     HDL CHOLESTEROL (mg/dL)   Date Value   05/01/2019 38 (L)     LDL-CHOLESTEROL (mg/dL

## 2021-03-19 DIAGNOSIS — F32.89 OTHER DEPRESSION: ICD-10-CM

## 2021-03-19 RX ORDER — ESCITALOPRAM OXALATE 5 MG/1
5 TABLET ORAL DAILY
Qty: 30 TABLET | Refills: 5 | Status: SHIPPED | OUTPATIENT
Start: 2021-03-19

## 2021-03-19 NOTE — TELEPHONE ENCOUNTER
Future appt:     Last Appointment with provider:   2/22/2021-advised Return in about 6 months (around 8/22/2021)    Last appointment at [facility]:  2/22/2021    Last refill: 2/22/21- escitalopram   CHOLESTEROL, TOTAL (mg/dL)   Date Value   05/01/2019 20

## 2021-04-08 ENCOUNTER — TELEPHONE (OUTPATIENT)
Dept: FAMILY MEDICINE CLINIC | Facility: CLINIC | Age: 51
End: 2021-04-08

## 2021-04-08 NOTE — TELEPHONE ENCOUNTER
Received a signed medical records request from [facility]. [address]   [phone]    Fax [phone] requesting all records.     Request secured emailed to [email or web address]

## 2021-08-24 RX ORDER — ATORVASTATIN CALCIUM 10 MG/1
TABLET, FILM COATED ORAL
Qty: 90 TABLET | Refills: 1 | OUTPATIENT
Start: 2021-08-24

## 2023-08-04 NOTE — TELEPHONE ENCOUNTER
Left message for patient to return my phone call.
Let pt know the following below. Pt verbalized his understanding and had no other questions at this time. Faxed to Dr Guillermina Couch.
Please inform patient that holter monitor shows PVCs 2.4 % of total time he had monitor. Recommend to continue with metoprolol and schedule appointment with Dr Mojica-cardiologist.   Also fax hoter report and referral to his office.
Scheduled

## (undated) NOTE — LETTER
03/10/21        Loyda Lee  [address]      Dear Steve Metzger,    Our records indicate that you have outstanding lab work and or testing that was ordered for you and has not yet been completed:  Orders Placed This Encounter      [facility] [place]

## (undated) NOTE — LETTER
08/12/19        Loyda Lee  [address]      Dear Steve Metzger,    Our records indicate that you have outstanding lab work and or testing that was ordered for you and has not yet been completed:  Orders Placed This Encounter      Radha Henning

## (undated) NOTE — LETTER
07/25/18        Ghazal [address] 1973      Dear Iris Ortiz,    Our records indicate that you have outstanding lab work and or testing that was ordered for you and has not yet been completed:          CBC With Diff      CMP      Lipid

## (undated) NOTE — LETTER
[address]  [employer] [address] [place] [address]            September 11, 2017      [address]      Dear Wandy Astudillo:     Our office has been trying to contact you t

## (undated) NOTE — LETTER
03/24/21        Lyric Coto  [address]      Dear Merlyn Fiore,    Our records indicate that you have outstanding lab work and or testing that was ordered for you and has not yet been completed:  Orders Placed This Encounter      AMY W Dif